# Patient Record
Sex: FEMALE | Race: WHITE | NOT HISPANIC OR LATINO
[De-identification: names, ages, dates, MRNs, and addresses within clinical notes are randomized per-mention and may not be internally consistent; named-entity substitution may affect disease eponyms.]

---

## 2017-01-19 ENCOUNTER — FORM ENCOUNTER (OUTPATIENT)
Age: 57
End: 2017-01-19

## 2017-01-19 PROBLEM — Z86.39 HISTORY OF HYPOTHYROIDISM: Status: RESOLVED | Noted: 2017-01-19 | Resolved: 2017-01-19

## 2017-01-19 PROBLEM — Z86.39 HISTORY OF DIABETES MELLITUS: Status: RESOLVED | Noted: 2017-01-19 | Resolved: 2017-01-19

## 2017-01-19 PROBLEM — C50.911 BREAST CANCER, RIGHT: Status: ACTIVE | Noted: 2017-01-19

## 2017-01-19 PROBLEM — Z86.79 HISTORY OF HYPERTENSION: Status: RESOLVED | Noted: 2017-01-19 | Resolved: 2017-01-19

## 2017-01-19 PROBLEM — G47.33 OSA (OBSTRUCTIVE SLEEP APNEA): Status: RESOLVED | Noted: 2017-01-19 | Resolved: 2017-01-19

## 2017-01-19 PROBLEM — Z92.21 HISTORY OF CHEMOTHERAPY: Status: RESOLVED | Noted: 2017-01-19 | Resolved: 2017-01-19

## 2017-01-19 PROBLEM — Z87.891 FORMER SMOKER: Status: ACTIVE | Noted: 2017-01-19

## 2017-01-19 RX ORDER — MULTIVITAMIN
TABLET ORAL
Refills: 0 | Status: ACTIVE | COMMUNITY

## 2017-01-19 RX ORDER — LOSARTAN POTASSIUM 50 MG/1
50 TABLET, FILM COATED ORAL
Refills: 0 | Status: ACTIVE | COMMUNITY

## 2017-01-19 RX ORDER — LEVOTHYROXINE SODIUM 75 UG/1
75 TABLET ORAL
Refills: 0 | Status: ACTIVE | COMMUNITY

## 2017-01-19 RX ORDER — ASPIRIN 81 MG
81 TABLET, DELAYED RELEASE (ENTERIC COATED) ORAL
Refills: 0 | Status: ACTIVE | COMMUNITY

## 2017-01-19 RX ORDER — ACETAMINOPHEN 325 MG/1
TABLET, FILM COATED ORAL
Refills: 0 | Status: ACTIVE | COMMUNITY

## 2017-01-19 RX ORDER — METFORMIN HYDROCHLORIDE 500 MG/1
500 TABLET, COATED ORAL
Refills: 0 | Status: ACTIVE | COMMUNITY

## 2017-01-19 RX ORDER — AMLODIPINE BESYLATE 5 MG/1
5 TABLET ORAL
Refills: 0 | Status: ACTIVE | COMMUNITY

## 2017-01-20 ENCOUNTER — OUTPATIENT (OUTPATIENT)
Dept: OUTPATIENT SERVICES | Facility: HOSPITAL | Age: 57
LOS: 1 days | End: 2017-01-20
Payer: COMMERCIAL

## 2017-01-20 ENCOUNTER — APPOINTMENT (OUTPATIENT)
Dept: INTERVENTIONAL RADIOLOGY/VASCULAR | Facility: CLINIC | Age: 57
End: 2017-01-20

## 2017-01-20 ENCOUNTER — APPOINTMENT (OUTPATIENT)
Dept: ULTRASOUND IMAGING | Facility: HOSPITAL | Age: 57
End: 2017-01-20

## 2017-01-20 DIAGNOSIS — Z78.9 OTHER SPECIFIED HEALTH STATUS: ICD-10-CM

## 2017-01-20 DIAGNOSIS — T14.8 OTHER INJURY OF UNSPECIFIED BODY REGION: ICD-10-CM

## 2017-01-20 DIAGNOSIS — C50.919 MALIGNANT NEOPLASM OF UNSPECIFIED SITE OF UNSPECIFIED FEMALE BREAST: Chronic | ICD-10-CM

## 2017-01-20 DIAGNOSIS — C50.911 MALIGNANT NEOPLASM OF UNSPECIFIED SITE OF RIGHT FEMALE BREAST: ICD-10-CM

## 2017-01-20 DIAGNOSIS — Z92.21 PERSONAL HISTORY OF ANTINEOPLASTIC CHEMOTHERAPY: ICD-10-CM

## 2017-01-20 DIAGNOSIS — G47.33 OBSTRUCTIVE SLEEP APNEA (ADULT) (PEDIATRIC): ICD-10-CM

## 2017-01-20 DIAGNOSIS — Z92.3 PERSONAL HISTORY OF IRRADIATION: ICD-10-CM

## 2017-01-20 DIAGNOSIS — Z87.891 PERSONAL HISTORY OF NICOTINE DEPENDENCE: ICD-10-CM

## 2017-01-20 DIAGNOSIS — Z86.79 PERSONAL HISTORY OF OTHER DISEASES OF THE CIRCULATORY SYSTEM: ICD-10-CM

## 2017-01-20 DIAGNOSIS — Z86.39 PERSONAL HISTORY OF OTHER ENDOCRINE, NUTRITIONAL AND METABOLIC DISEASE: ICD-10-CM

## 2017-01-20 DIAGNOSIS — R52 PAIN, UNSPECIFIED: Chronic | ICD-10-CM

## 2017-01-20 PROCEDURE — 76705 ECHO EXAM OF ABDOMEN: CPT | Mod: 26

## 2017-01-24 ENCOUNTER — OUTPATIENT (OUTPATIENT)
Dept: OUTPATIENT SERVICES | Facility: HOSPITAL | Age: 57
LOS: 1 days | End: 2017-01-24
Payer: COMMERCIAL

## 2017-01-24 DIAGNOSIS — C50.911 MALIGNANT NEOPLASM OF UNSPECIFIED SITE OF RIGHT FEMALE BREAST: ICD-10-CM

## 2017-01-24 DIAGNOSIS — C50.919 MALIGNANT NEOPLASM OF UNSPECIFIED SITE OF UNSPECIFIED FEMALE BREAST: Chronic | ICD-10-CM

## 2017-01-24 DIAGNOSIS — R18.8 OTHER ASCITES: ICD-10-CM

## 2017-01-24 DIAGNOSIS — R52 PAIN, UNSPECIFIED: Chronic | ICD-10-CM

## 2017-01-24 LAB
GRAM STN WND: SIGNIFICANT CHANGE UP
SPECIMEN SOURCE: SIGNIFICANT CHANGE UP

## 2017-01-24 PROCEDURE — 10030 IMG GID FLU COLL DRG SFT TIS: CPT

## 2017-01-26 LAB — SPECIMEN SOURCE: SIGNIFICANT CHANGE UP

## 2017-01-29 LAB — CULTURE - SURGICAL SITE: SIGNIFICANT CHANGE UP

## 2017-01-30 ENCOUNTER — OUTPATIENT (OUTPATIENT)
Dept: OUTPATIENT SERVICES | Facility: HOSPITAL | Age: 57
LOS: 1 days | End: 2017-01-30
Payer: COMMERCIAL

## 2017-01-30 DIAGNOSIS — C50.919 MALIGNANT NEOPLASM OF UNSPECIFIED SITE OF UNSPECIFIED FEMALE BREAST: Chronic | ICD-10-CM

## 2017-01-30 DIAGNOSIS — R52 PAIN, UNSPECIFIED: Chronic | ICD-10-CM

## 2017-01-30 PROCEDURE — 49185 SCLEROTX FLUID COLLECTION: CPT

## 2017-01-31 DIAGNOSIS — L76.34 POSTPROCEDURAL SEROMA OF SKIN AND SUBCUTANEOUS TISSUE FOLLOWING OTHER PROCEDURE: ICD-10-CM

## 2017-02-02 ENCOUNTER — OUTPATIENT (OUTPATIENT)
Dept: OUTPATIENT SERVICES | Facility: HOSPITAL | Age: 57
LOS: 1 days | End: 2017-02-02
Payer: COMMERCIAL

## 2017-02-02 DIAGNOSIS — C50.919 MALIGNANT NEOPLASM OF UNSPECIFIED SITE OF UNSPECIFIED FEMALE BREAST: Chronic | ICD-10-CM

## 2017-02-02 DIAGNOSIS — R52 PAIN, UNSPECIFIED: Chronic | ICD-10-CM

## 2017-02-02 DIAGNOSIS — K65.1 PERITONEAL ABSCESS: ICD-10-CM

## 2017-02-02 PROCEDURE — 76705 ECHO EXAM OF ABDOMEN: CPT | Mod: 26

## 2017-02-06 DIAGNOSIS — L76.34 POSTPROCEDURAL SEROMA OF SKIN AND SUBCUTANEOUS TISSUE FOLLOWING OTHER PROCEDURE: ICD-10-CM

## 2017-02-06 DIAGNOSIS — Z43.8 ENCOUNTER FOR ATTENTION TO OTHER ARTIFICIAL OPENINGS: ICD-10-CM

## 2017-02-06 DIAGNOSIS — R18.8 OTHER ASCITES: ICD-10-CM

## 2017-02-21 LAB — FUNGUS SPEC QL CULT: SIGNIFICANT CHANGE UP

## 2017-02-24 ENCOUNTER — OUTPATIENT (OUTPATIENT)
Dept: OUTPATIENT SERVICES | Facility: HOSPITAL | Age: 57
LOS: 1 days | End: 2017-02-24
Payer: COMMERCIAL

## 2017-02-24 DIAGNOSIS — S30.1XXD CONTUSION OF ABDOMINAL WALL, SUBSEQUENT ENCOUNTER: ICD-10-CM

## 2017-02-24 DIAGNOSIS — C50.919 MALIGNANT NEOPLASM OF UNSPECIFIED SITE OF UNSPECIFIED FEMALE BREAST: Chronic | ICD-10-CM

## 2017-02-24 DIAGNOSIS — R52 PAIN, UNSPECIFIED: Chronic | ICD-10-CM

## 2017-02-24 PROCEDURE — 76705 ECHO EXAM OF ABDOMEN: CPT | Mod: 26

## 2017-02-28 DIAGNOSIS — M79.81 NONTRAUMATIC HEMATOMA OF SOFT TISSUE: ICD-10-CM

## 2017-03-24 ENCOUNTER — OUTPATIENT (OUTPATIENT)
Dept: OUTPATIENT SERVICES | Facility: HOSPITAL | Age: 57
LOS: 1 days | End: 2017-03-24
Payer: COMMERCIAL

## 2017-03-24 VITALS
TEMPERATURE: 98 F | DIASTOLIC BLOOD PRESSURE: 80 MMHG | HEART RATE: 72 BPM | RESPIRATION RATE: 15 BRPM | HEIGHT: 62.5 IN | WEIGHT: 151.02 LBS | SYSTOLIC BLOOD PRESSURE: 150 MMHG

## 2017-03-24 DIAGNOSIS — E11.9 TYPE 2 DIABETES MELLITUS WITHOUT COMPLICATIONS: ICD-10-CM

## 2017-03-24 DIAGNOSIS — C50.919 MALIGNANT NEOPLASM OF UNSPECIFIED SITE OF UNSPECIFIED FEMALE BREAST: Chronic | ICD-10-CM

## 2017-03-24 DIAGNOSIS — I10 ESSENTIAL (PRIMARY) HYPERTENSION: ICD-10-CM

## 2017-03-24 DIAGNOSIS — Z90.11 ACQUIRED ABSENCE OF RIGHT BREAST AND NIPPLE: ICD-10-CM

## 2017-03-24 DIAGNOSIS — R52 PAIN, UNSPECIFIED: Chronic | ICD-10-CM

## 2017-03-24 DIAGNOSIS — Z98.82 BREAST IMPLANT STATUS: Chronic | ICD-10-CM

## 2017-03-24 DIAGNOSIS — C50.919 MALIGNANT NEOPLASM OF UNSPECIFIED SITE OF UNSPECIFIED FEMALE BREAST: ICD-10-CM

## 2017-03-24 LAB
BASOPHILS # BLD AUTO: 0.04 K/UL — SIGNIFICANT CHANGE UP (ref 0–0.2)
BASOPHILS NFR BLD AUTO: 0.6 % — SIGNIFICANT CHANGE UP (ref 0–2)
BUN SERPL-MCNC: 13 MG/DL — SIGNIFICANT CHANGE UP (ref 7–23)
CALCIUM SERPL-MCNC: 11 MG/DL — HIGH (ref 8.4–10.5)
CHLORIDE SERPL-SCNC: 100 MMOL/L — SIGNIFICANT CHANGE UP (ref 98–107)
CO2 SERPL-SCNC: 26 MMOL/L — SIGNIFICANT CHANGE UP (ref 22–31)
CREAT SERPL-MCNC: 0.77 MG/DL — SIGNIFICANT CHANGE UP (ref 0.5–1.3)
EOSINOPHIL # BLD AUTO: 0.22 K/UL — SIGNIFICANT CHANGE UP (ref 0–0.5)
EOSINOPHIL NFR BLD AUTO: 3.3 % — SIGNIFICANT CHANGE UP (ref 0–6)
GLUCOSE SERPL-MCNC: 102 MG/DL — HIGH (ref 70–99)
HBA1C BLD-MCNC: 5.4 % — SIGNIFICANT CHANGE UP (ref 4–5.6)
HCT VFR BLD CALC: 43.4 % — SIGNIFICANT CHANGE UP (ref 34.5–45)
HGB BLD-MCNC: 13.6 G/DL — SIGNIFICANT CHANGE UP (ref 11.5–15.5)
IMM GRANULOCYTES NFR BLD AUTO: 0.1 % — SIGNIFICANT CHANGE UP (ref 0–1.5)
LYMPHOCYTES # BLD AUTO: 1.69 K/UL — SIGNIFICANT CHANGE UP (ref 1–3.3)
LYMPHOCYTES # BLD AUTO: 25.3 % — SIGNIFICANT CHANGE UP (ref 13–44)
MCHC RBC-ENTMCNC: 27 PG — SIGNIFICANT CHANGE UP (ref 27–34)
MCHC RBC-ENTMCNC: 31.3 % — LOW (ref 32–36)
MCV RBC AUTO: 86.3 FL — SIGNIFICANT CHANGE UP (ref 80–100)
MONOCYTES # BLD AUTO: 0.49 K/UL — SIGNIFICANT CHANGE UP (ref 0–0.9)
MONOCYTES NFR BLD AUTO: 7.3 % — SIGNIFICANT CHANGE UP (ref 2–14)
NEUTROPHILS # BLD AUTO: 4.23 K/UL — SIGNIFICANT CHANGE UP (ref 1.8–7.4)
NEUTROPHILS NFR BLD AUTO: 63.4 % — SIGNIFICANT CHANGE UP (ref 43–77)
PLATELET # BLD AUTO: 306 K/UL — SIGNIFICANT CHANGE UP (ref 150–400)
PMV BLD: 10.7 FL — SIGNIFICANT CHANGE UP (ref 7–13)
POTASSIUM SERPL-MCNC: 4.7 MMOL/L — SIGNIFICANT CHANGE UP (ref 3.5–5.3)
POTASSIUM SERPL-SCNC: 4.7 MMOL/L — SIGNIFICANT CHANGE UP (ref 3.5–5.3)
RBC # BLD: 5.03 M/UL — SIGNIFICANT CHANGE UP (ref 3.8–5.2)
RBC # FLD: 15.8 % — HIGH (ref 10.3–14.5)
SODIUM SERPL-SCNC: 143 MMOL/L — SIGNIFICANT CHANGE UP (ref 135–145)
WBC # BLD: 6.68 K/UL — SIGNIFICANT CHANGE UP (ref 3.8–10.5)
WBC # FLD AUTO: 6.68 K/UL — SIGNIFICANT CHANGE UP (ref 3.8–10.5)

## 2017-03-24 PROCEDURE — 93010 ELECTROCARDIOGRAM REPORT: CPT

## 2017-03-24 NOTE — H&P PST ADULT - PMH
Breast cancer  right side diagnosed in December 2014 -- underwent right mastectomy with LND followed by chemotherapy and radiation therapy  Diabetes mellitus  diagnosed in 2013  Hypertension    Hypothyroid

## 2017-03-24 NOTE — H&P PST ADULT - PSH
Breast cancer  December 2014,  right mastectomy with LND followed with chemotheray and radiation therapy  H/O breast reconstruction  12/2016 - NATALI

## 2017-03-24 NOTE — H&P PST ADULT - PROBLEM SELECTOR PLAN 1
56 yr old female scheduled for revision of right reconstructed breast left mastopexy abdominal scar revision on 4/03/2017  Pre op instruction given and patient verbalized understanding 56 yr old female scheduled for revision of right reconstructed breast left mastopexy abdominal scar revision on 4/03/2017  Pre op instruction given and patient verbalized understanding  3 Positive on STOP BANG questioner, LAURA precaution recommended, OR booking notified

## 2017-03-24 NOTE — H&P PST ADULT - RS GEN PE MLT RESP DETAILS PC
clear to auscultation bilaterally/no rhonchi/no rales/no wheezes no wheezes/no rhonchi/no rales/breath sounds equal/clear to auscultation bilaterally

## 2017-03-24 NOTE — H&P PST ADULT - PROBLEM SELECTOR PLAN 3
Patient instructed to hold evening dose of Metformin the night before and not to take am dose in the morning of surgery  Will check FSBS in am of surgery

## 2017-03-24 NOTE — H&P PST ADULT - NSANTHOSAYNRD_GEN_A_CORE
No. LAURA screening performed.  STOP BANG Legend: 0-2 = LOW Risk; 3-4 = INTERMEDIATE Risk; 5-8 = HIGH Risk

## 2017-03-24 NOTE — H&P PST ADULT - PROBLEM SELECTOR PLAN 2
/80 mmHg, Patient to continue medication and to take in am of surgery  Will obtain stress and echo result

## 2017-03-24 NOTE — H&P PST ADULT - ANESTHESIA, PREVIOUS REACTION, PROFILE
woke up at end of Mastectomy  and the last surgery 12/2016when "cleaning her up" and woke up at end of oral surgery

## 2017-03-24 NOTE — H&P PST ADULT - LYMPHATIC
supraclavicular R/anterior cervical L/supraclavicular L/anterior cervical R/posterior cervical R/posterior cervical L

## 2017-03-24 NOTE — H&P PST ADULT - HISTORY OF PRESENT ILLNESS
This is a 55 y/o female with h/o right breast cancer diagnosed in December 2014. Underwent right mastectomy and LND in December 2014 followed with chemotherapy and RT. Surgeon recommended patient wait before she should undergo further surgery and s/p delayed right breast reconstruction with abdominal flap on 12-28-16. Patient This is a 55 y/o female with h/o right breast cancer diagnosed in December 2014. Underwent right mastectomy and LND in December 2014 followed with chemotherapy and RT. Surgeon recommended patient wait before she should undergo further surgery and s/p delayed right breast reconstruction with abdominal flap on 12-28-16. Patient scheduled for revision of right reconstructed breast, left mastopexy, abdominal scar evision on 4/03/2017

## 2017-03-24 NOTE — H&P PST ADULT - NEGATIVE NEUROLOGICAL SYMPTOMS
no generalized seizures/no paresthesias/no weakness/no headache/no cva/no focal seizures/no transient paralysis

## 2017-04-04 ENCOUNTER — TRANSCRIPTION ENCOUNTER (OUTPATIENT)
Age: 57
End: 2017-04-04

## 2017-06-13 ENCOUNTER — APPOINTMENT (OUTPATIENT)
Dept: VASCULAR SURGERY | Facility: CLINIC | Age: 57
End: 2017-06-13

## 2017-06-13 VITALS
BODY MASS INDEX: 25.95 KG/M2 | WEIGHT: 152 LBS | HEIGHT: 64 IN | SYSTOLIC BLOOD PRESSURE: 150 MMHG | TEMPERATURE: 98.6 F | HEART RATE: 85 BPM | DIASTOLIC BLOOD PRESSURE: 76 MMHG

## 2017-06-13 DIAGNOSIS — I77.1 STRICTURE OF ARTERY: ICD-10-CM

## 2017-06-13 DIAGNOSIS — I77.9 DISORDER OF ARTERIES AND ARTERIOLES, UNSPECIFIED: ICD-10-CM

## 2017-06-13 DIAGNOSIS — I65.23 OCCLUSION AND STENOSIS OF BILATERAL CAROTID ARTERIES: ICD-10-CM

## 2017-06-13 RX ORDER — LETROZOLE TABLETS 2.5 MG/1
2.5 TABLET, FILM COATED ORAL
Refills: 0 | Status: ACTIVE | COMMUNITY

## 2017-09-27 ENCOUNTER — OUTPATIENT (OUTPATIENT)
Dept: OUTPATIENT SERVICES | Facility: HOSPITAL | Age: 57
LOS: 1 days | End: 2017-09-27
Payer: MEDICARE

## 2017-09-27 VITALS
HEIGHT: 62.5 IN | DIASTOLIC BLOOD PRESSURE: 76 MMHG | WEIGHT: 154.98 LBS | TEMPERATURE: 99 F | HEART RATE: 83 BPM | SYSTOLIC BLOOD PRESSURE: 156 MMHG

## 2017-09-27 DIAGNOSIS — L90.5 SCAR CONDITIONS AND FIBROSIS OF SKIN: ICD-10-CM

## 2017-09-27 DIAGNOSIS — E11.9 TYPE 2 DIABETES MELLITUS WITHOUT COMPLICATIONS: ICD-10-CM

## 2017-09-27 DIAGNOSIS — Z98.82 BREAST IMPLANT STATUS: Chronic | ICD-10-CM

## 2017-09-27 DIAGNOSIS — E03.9 HYPOTHYROIDISM, UNSPECIFIED: ICD-10-CM

## 2017-09-27 DIAGNOSIS — C50.919 MALIGNANT NEOPLASM OF UNSPECIFIED SITE OF UNSPECIFIED FEMALE BREAST: Chronic | ICD-10-CM

## 2017-09-27 DIAGNOSIS — I10 ESSENTIAL (PRIMARY) HYPERTENSION: ICD-10-CM

## 2017-09-27 LAB
BUN SERPL-MCNC: 17 MG/DL — SIGNIFICANT CHANGE UP (ref 7–23)
CALCIUM SERPL-MCNC: 10 MG/DL — SIGNIFICANT CHANGE UP (ref 8.4–10.5)
CHLORIDE SERPL-SCNC: 100 MMOL/L — SIGNIFICANT CHANGE UP (ref 98–107)
CO2 SERPL-SCNC: 22 MMOL/L — SIGNIFICANT CHANGE UP (ref 22–31)
CREAT SERPL-MCNC: 0.79 MG/DL — SIGNIFICANT CHANGE UP (ref 0.5–1.3)
GLUCOSE SERPL-MCNC: 75 MG/DL — SIGNIFICANT CHANGE UP (ref 70–99)
HBA1C BLD-MCNC: 6 % — HIGH (ref 4–5.6)
HCT VFR BLD CALC: 43.4 % — SIGNIFICANT CHANGE UP (ref 34.5–45)
HGB BLD-MCNC: 14 G/DL — SIGNIFICANT CHANGE UP (ref 11.5–15.5)
MCHC RBC-ENTMCNC: 28.2 PG — SIGNIFICANT CHANGE UP (ref 27–34)
MCHC RBC-ENTMCNC: 32.3 % — SIGNIFICANT CHANGE UP (ref 32–36)
MCV RBC AUTO: 87.3 FL — SIGNIFICANT CHANGE UP (ref 80–100)
NRBC # FLD: 0 — SIGNIFICANT CHANGE UP
PLATELET # BLD AUTO: 290 K/UL — SIGNIFICANT CHANGE UP (ref 150–400)
PMV BLD: 10.4 FL — SIGNIFICANT CHANGE UP (ref 7–13)
POTASSIUM SERPL-MCNC: 3.8 MMOL/L — SIGNIFICANT CHANGE UP (ref 3.5–5.3)
POTASSIUM SERPL-SCNC: 3.8 MMOL/L — SIGNIFICANT CHANGE UP (ref 3.5–5.3)
RBC # BLD: 4.97 M/UL — SIGNIFICANT CHANGE UP (ref 3.8–5.2)
RBC # FLD: 14.6 % — HIGH (ref 10.3–14.5)
SODIUM SERPL-SCNC: 139 MMOL/L — SIGNIFICANT CHANGE UP (ref 135–145)
WBC # BLD: 7.73 K/UL — SIGNIFICANT CHANGE UP (ref 3.8–10.5)
WBC # FLD AUTO: 7.73 K/UL — SIGNIFICANT CHANGE UP (ref 3.8–10.5)

## 2017-09-27 PROCEDURE — 93010 ELECTROCARDIOGRAM REPORT: CPT

## 2017-09-27 NOTE — H&P PST ADULT - HISTORY OF PRESENT ILLNESS
56 yr old female with medical hx of htn, DM II and breast cancer s/p right mastectomy with reconstruction presents for preop evaluation.  Pt is now scheduled for Right Revision of Reconstructed Breast and Left Mastopexy on 10/02/17.

## 2017-09-27 NOTE — H&P PST ADULT - GASTROINTESTINAL DETAILS
no organomegaly/bowel sounds normal/no rebound tenderness/nontender/no distention/no bruit/soft/no masses palpable

## 2017-09-27 NOTE — H&P PST ADULT - BREASTS COMMENTS
breast asymmetry right higher than left 2/2 hx of right mastectomy with reconstruction no nipples noted on right breast

## 2017-09-27 NOTE — H&P PST ADULT - PROBLEM SELECTOR PLAN 1
Right Revision of Reconstructed Breast and Left Mastopexy on 10/02/17.  Lab results pending.  Preop, famotidine and chlorhexidine instructions provided and questions addressed.

## 2017-09-27 NOTE — H&P PST ADULT - LYMPHATIC
posterior cervical R/anterior cervical L/supraclavicular R/supraclavicular L/anterior cervical R/posterior cervical L

## 2017-09-27 NOTE — H&P PST ADULT - RS GEN PE MLT RESP DETAILS PC
no rhonchi/no rales/respirations non-labored/clear to auscultation bilaterally/breath sounds equal/no wheezes

## 2017-09-27 NOTE — H&P PST ADULT - PMH
Breast cancer  right side diagnosed in December 2014 -- underwent right mastectomy with LND followed by chemotherapy and radiation therapy  Diabetes mellitus  diagnosed in 2013  Hypertension    Hypothyroid    Lymphedema of right arm    Scar condition and fibrosis of skin

## 2017-09-27 NOTE — H&P PST ADULT - FAMILY HISTORY
Father  Still living? Unknown  Family history of ischemic heart disease, Age at diagnosis: Age Unknown     Mother  Still living? No  Family history of ischemic heart disease, Age at diagnosis: Age Unknown

## 2017-10-02 ENCOUNTER — OUTPATIENT (OUTPATIENT)
Dept: OUTPATIENT SERVICES | Facility: HOSPITAL | Age: 57
LOS: 1 days | Discharge: HOME | End: 2017-10-02
Payer: MEDICARE

## 2017-10-02 ENCOUNTER — TRANSCRIPTION ENCOUNTER (OUTPATIENT)
Age: 57
End: 2017-10-02

## 2017-10-02 ENCOUNTER — RESULT REVIEW (OUTPATIENT)
Age: 57
End: 2017-10-02

## 2017-10-02 VITALS
TEMPERATURE: 98 F | RESPIRATION RATE: 20 BRPM | WEIGHT: 154.98 LBS | SYSTOLIC BLOOD PRESSURE: 124 MMHG | OXYGEN SATURATION: 97 % | DIASTOLIC BLOOD PRESSURE: 68 MMHG | HEART RATE: 70 BPM | HEIGHT: 62.5 IN

## 2017-10-02 VITALS
OXYGEN SATURATION: 99 % | RESPIRATION RATE: 15 BRPM | TEMPERATURE: 98 F | SYSTOLIC BLOOD PRESSURE: 118 MMHG | DIASTOLIC BLOOD PRESSURE: 64 MMHG | HEART RATE: 82 BPM

## 2017-10-02 DIAGNOSIS — C50.919 MALIGNANT NEOPLASM OF UNSPECIFIED SITE OF UNSPECIFIED FEMALE BREAST: Chronic | ICD-10-CM

## 2017-10-02 DIAGNOSIS — Z98.82 BREAST IMPLANT STATUS: Chronic | ICD-10-CM

## 2017-10-02 DIAGNOSIS — L90.5 SCAR CONDITIONS AND FIBROSIS OF SKIN: ICD-10-CM

## 2017-10-02 PROCEDURE — 88305 TISSUE EXAM BY PATHOLOGIST: CPT | Mod: 26

## 2017-10-02 PROCEDURE — 88304 TISSUE EXAM BY PATHOLOGIST: CPT | Mod: 26

## 2017-10-02 NOTE — ASU DISCHARGE PLAN (ADULT/PEDIATRIC). - NURSING INSTRUCTIONS
Narcotic pain medicine is constipating , Buy over the counter stool softener and take as instructed on the bottle.   DONOT TAKE TYLENOL WITH THE PAIN MEDICINE AS THERE IS TYLENOL IN THE PAIN MEDICINE. Narcotic pain medicine is constipating , Buy over the counter stool softener and take as instructed on the bottle.   DO NOT TAKE TYLENOL WITH THE PAIN MEDICINE AS THERE IS TYLENOL IN THE PAIN MEDICINE.

## 2017-10-02 NOTE — ASU DISCHARGE PLAN (ADULT/PEDIATRIC). - FOLLOWUP APPOINTMENT CLINIC/PHYSICIAN
Call MD office for follow-up appointment Call MD office for follow-up appointment on October 6th, Friday

## 2017-10-02 NOTE — ASU DISCHARGE PLAN (ADULT/PEDIATRIC). - NOTIFY
Persistent Nausea and Vomiting/Bleeding that does not stop/Unable to Urinate/Pain not relieved by Medications/Numbness, color, or temperature change to extremity/Fever greater than 101/Swelling that continues

## 2017-10-02 NOTE — ASU DISCHARGE PLAN (ADULT/PEDIATRIC). - INSTRUCTIONS
progress slowly,avoid any foods that are spicy, greasy or fatty, increase fluids and resume regular diet in am progress slowly, avoid any foods that are spicy, greasy or fatty, increase fluids and resume regular diet in am

## 2018-01-24 ENCOUNTER — OUTPATIENT (OUTPATIENT)
Dept: OUTPATIENT SERVICES | Facility: HOSPITAL | Age: 58
LOS: 1 days | End: 2018-01-24
Payer: MEDICARE

## 2018-01-24 VITALS
RESPIRATION RATE: 16 BRPM | DIASTOLIC BLOOD PRESSURE: 70 MMHG | HEIGHT: 64 IN | TEMPERATURE: 98 F | OXYGEN SATURATION: 99 % | SYSTOLIC BLOOD PRESSURE: 130 MMHG | WEIGHT: 158.07 LBS | HEART RATE: 60 BPM

## 2018-01-24 DIAGNOSIS — Z90.11 ACQUIRED ABSENCE OF RIGHT BREAST AND NIPPLE: ICD-10-CM

## 2018-01-24 DIAGNOSIS — Z98.82 BREAST IMPLANT STATUS: Chronic | ICD-10-CM

## 2018-01-24 DIAGNOSIS — Z98.890 OTHER SPECIFIED POSTPROCEDURAL STATES: Chronic | ICD-10-CM

## 2018-01-24 DIAGNOSIS — N64.89 OTHER SPECIFIED DISORDERS OF BREAST: ICD-10-CM

## 2018-01-24 DIAGNOSIS — C50.919 MALIGNANT NEOPLASM OF UNSPECIFIED SITE OF UNSPECIFIED FEMALE BREAST: Chronic | ICD-10-CM

## 2018-01-24 LAB
ALBUMIN SERPL ELPH-MCNC: 4.6 G/DL — SIGNIFICANT CHANGE UP (ref 3.3–5)
ALP SERPL-CCNC: 96 U/L — SIGNIFICANT CHANGE UP (ref 40–120)
ALT FLD-CCNC: 16 U/L — SIGNIFICANT CHANGE UP (ref 4–33)
AST SERPL-CCNC: 16 U/L — SIGNIFICANT CHANGE UP (ref 4–32)
BILIRUB SERPL-MCNC: 0.2 MG/DL — SIGNIFICANT CHANGE UP (ref 0.2–1.2)
BUN SERPL-MCNC: 14 MG/DL — SIGNIFICANT CHANGE UP (ref 7–23)
CALCIUM SERPL-MCNC: 9.8 MG/DL — SIGNIFICANT CHANGE UP (ref 8.4–10.5)
CHLORIDE SERPL-SCNC: 101 MMOL/L — SIGNIFICANT CHANGE UP (ref 98–107)
CO2 SERPL-SCNC: 24 MMOL/L — SIGNIFICANT CHANGE UP (ref 22–31)
CREAT SERPL-MCNC: 0.79 MG/DL — SIGNIFICANT CHANGE UP (ref 0.5–1.3)
GLUCOSE SERPL-MCNC: 90 MG/DL — SIGNIFICANT CHANGE UP (ref 70–99)
HBA1C BLD-MCNC: 5.8 % — HIGH (ref 4–5.6)
HCT VFR BLD CALC: 44.7 % — SIGNIFICANT CHANGE UP (ref 34.5–45)
HGB BLD-MCNC: 13.8 G/DL — SIGNIFICANT CHANGE UP (ref 11.5–15.5)
MCHC RBC-ENTMCNC: 27.7 PG — SIGNIFICANT CHANGE UP (ref 27–34)
MCHC RBC-ENTMCNC: 30.9 % — LOW (ref 32–36)
MCV RBC AUTO: 89.8 FL — SIGNIFICANT CHANGE UP (ref 80–100)
NRBC # FLD: 0 — SIGNIFICANT CHANGE UP
PLATELET # BLD AUTO: 280 K/UL — SIGNIFICANT CHANGE UP (ref 150–400)
PMV BLD: 10.5 FL — SIGNIFICANT CHANGE UP (ref 7–13)
POTASSIUM SERPL-MCNC: 4.1 MMOL/L — SIGNIFICANT CHANGE UP (ref 3.5–5.3)
POTASSIUM SERPL-SCNC: 4.1 MMOL/L — SIGNIFICANT CHANGE UP (ref 3.5–5.3)
PROT SERPL-MCNC: 7.5 G/DL — SIGNIFICANT CHANGE UP (ref 6–8.3)
RBC # BLD: 4.98 M/UL — SIGNIFICANT CHANGE UP (ref 3.8–5.2)
RBC # FLD: 13.9 % — SIGNIFICANT CHANGE UP (ref 10.3–14.5)
SODIUM SERPL-SCNC: 141 MMOL/L — SIGNIFICANT CHANGE UP (ref 135–145)
WBC # BLD: 7.16 K/UL — SIGNIFICANT CHANGE UP (ref 3.8–10.5)
WBC # FLD AUTO: 7.16 K/UL — SIGNIFICANT CHANGE UP (ref 3.8–10.5)

## 2018-01-24 PROCEDURE — 93010 ELECTROCARDIOGRAM REPORT: CPT

## 2018-01-24 RX ORDER — CHOLECALCIFEROL (VITAMIN D3) 125 MCG
1 CAPSULE ORAL
Qty: 0 | Refills: 0 | COMMUNITY

## 2018-01-24 RX ORDER — LETROZOLE 2.5 MG/1
1 TABLET, FILM COATED ORAL
Qty: 0 | Refills: 0 | COMMUNITY

## 2018-01-24 NOTE — H&P PST ADULT - ANESTHESIA, PREVIOUS REACTION, PROFILE
woke up at end of Mastectomy  and the last surgery 12/2016when "cleaning her up" and woke up at end of oral surgery woke up at end of Mastectomy  and the surgery 12/2016when "cleaning her up" and woke up at end of oral surgery

## 2018-01-24 NOTE — H&P PST ADULT - PROBLEM SELECTOR PLAN 1
Pt. is scheduled for right nipple areola reconstruction, left breast scar revision, left nipple reduction 1/29/18.  Instructed pt. to take last dose of ASA 1/24/18.  Pt. denies coronary stents.  Instructed pt. to take last dose of Metformin in the morning 1/28/18.

## 2018-01-24 NOTE — H&P PST ADULT - PSH
Breast cancer  December 2014,  right mastectomy with LND followed with chemotherapy and radiation therapy  H/O breast reconstruction  12/2016 - NATALI Breast cancer  December 2014,  right mastectomy with LND followed with chemotherapy and radiation therapy  H/O breast reconstruction  12/2016 - NATALI  H/O breast surgery  right scar revision and left lift-9/2017

## 2018-01-24 NOTE — H&P PST ADULT - HISTORY OF PRESENT ILLNESS
Pt. is a 56 yo female with H/O breast cancer with NATALI FLAP.  Pt. is scheduled for right nipple reconstruction and left breast scar revision.

## 2018-01-24 NOTE — H&P PST ADULT - NSANTHOSAYNRD_GEN_A_CORE
No. LAURA screening performed.  STOP BANG Legend: 0-2 = LOW Risk; 3-4 = INTERMEDIATE Risk; 5-8 = HIGH Risk No. LAURA screening performed.  STOP BANG Legend: 0-2 = LOW Risk; 3-4 = INTERMEDIATE Risk; 5-8 = HIGH Risk/pt. states she no longer snores

## 2018-01-24 NOTE — H&P PST ADULT - REASON FOR ADMISSION
"they're making a nipple on the right breast and some revision on the left, cutting the nipple a little"

## 2018-01-24 NOTE — H&P PST ADULT - NS PRO LAST MENSTRUAL DATE
amphetamine-dextroamphetamine (ADDERALL XR) 20 MG per 24 hr capsule      Last Written Prescription Date:  9/26/17  Last Fill Quantity: 30,   # refills: 0  Future Office visit:       Routing refill request to provider for review/approval because:  Drug not on the [facility], [facility] or [facility] refill protocol or controlled substance     menopause age 51 2012

## 2018-01-24 NOTE — H&P PST ADULT - MALLAMPATI CLASS
I  with phonation on phonation/Class I (easy) - visualization of the soft palate, fauces, uvula, and both anterior and posterior pillars

## 2018-01-24 NOTE — H&P PST ADULT - VISION (WITH CORRECTIVE LENSES IF THE PATIENT USUALLY WEARS THEM):
Normal vision: sees adequately in most situations; can see medication labels, newsprint/distance glasses

## 2018-01-24 NOTE — H&P PST ADULT - ACTIVITY
can climb stairs without SOB, house chores "I run, I walk inside about 3" days/wk, "I have a mini trampoline"

## 2018-01-29 ENCOUNTER — OUTPATIENT (OUTPATIENT)
Dept: OUTPATIENT SERVICES | Facility: HOSPITAL | Age: 58
LOS: 1 days | Discharge: HOME | End: 2018-01-29

## 2018-01-29 VITALS
HEART RATE: 89 BPM | SYSTOLIC BLOOD PRESSURE: 104 MMHG | DIASTOLIC BLOOD PRESSURE: 56 MMHG | OXYGEN SATURATION: 99 % | RESPIRATION RATE: 12 BRPM

## 2018-01-29 VITALS
SYSTOLIC BLOOD PRESSURE: 131 MMHG | RESPIRATION RATE: 16 BRPM | TEMPERATURE: 99 F | HEIGHT: 64 IN | DIASTOLIC BLOOD PRESSURE: 67 MMHG | OXYGEN SATURATION: 99 % | HEART RATE: 73 BPM | WEIGHT: 158.07 LBS

## 2018-01-29 DIAGNOSIS — Z90.11 ACQUIRED ABSENCE OF RIGHT BREAST AND NIPPLE: ICD-10-CM

## 2018-01-29 DIAGNOSIS — Z98.890 OTHER SPECIFIED POSTPROCEDURAL STATES: Chronic | ICD-10-CM

## 2018-01-29 DIAGNOSIS — Z98.82 BREAST IMPLANT STATUS: Chronic | ICD-10-CM

## 2018-01-29 DIAGNOSIS — C50.919 MALIGNANT NEOPLASM OF UNSPECIFIED SITE OF UNSPECIFIED FEMALE BREAST: Chronic | ICD-10-CM

## 2018-01-29 LAB — GLUCOSE BLDC GLUCOMTR-MCNC: 89 MG/DL — SIGNIFICANT CHANGE UP (ref 70–99)

## 2018-01-29 NOTE — ASU PREOP CHECKLIST - MEDICAL/PEDIATRIC CLEARANCE ON MEDICAL RECORD
PST````````````````````````````````````````````````````````````````````````````````````````````````````````````````````````````````````````````````````````````````````````````````````````````````````````````````````````````````````````````````````````````````````````````````````````````````````````````````````````````````````````````````````````````````````````````````````````````````````````````````````````````````````

## 2018-01-29 NOTE — BRIEF OPERATIVE NOTE - PROCEDURE
Reduction of left nipple  01/29/2018    Active  [id number]  Scar revision  01/29/2018  left breast  Active  [id number]  Reconstruction of nipple and areola  01/29/2018  right w/ skin graft for areola and skate flap for nipple  Active  [id number] <<-----Click on this checkbox to enter Procedure

## 2018-01-30 ENCOUNTER — TRANSCRIPTION ENCOUNTER (OUTPATIENT)
Age: 58
End: 2018-01-30

## 2019-06-06 NOTE — BRIEF OPERATIVE NOTE - OPERATION/FINDINGS
Revision of L breast scar, reduction of left nipple, reconstruction of R NAC with FTSG from L breast scar
06-Jun-2019 10:39

## 2022-01-02 NOTE — H&P PST ADULT - ANESTHESIA, PREVIOUS REACTION, PROFILE
woke up at end of Mastectomy  and the last surgery 12/2016when "cleaning her up" and woke up at end of oral surgery No

## 2023-10-01 PROBLEM — Z92.3 HISTORY OF RADIATION THERAPY: Status: RESOLVED | Noted: 2017-01-19 | Resolved: 2023-10-01
